# Patient Record
Sex: FEMALE | Race: BLACK OR AFRICAN AMERICAN | Employment: FULL TIME | URBAN - METROPOLITAN AREA
[De-identification: names, ages, dates, MRNs, and addresses within clinical notes are randomized per-mention and may not be internally consistent; named-entity substitution may affect disease eponyms.]

---

## 2017-01-16 LAB
BLOOD BANK REF CASE: NORMAL
BLOOD BANK REF CASE: NORMAL

## 2017-03-01 LAB
BLOOD BANK REF CASE: NORMAL
BLOOD BANK REF CASE: NORMAL

## 2017-05-17 LAB
HPV COMMENT: NORMAL
HPV TYPE 16: NOT DETECTED
HPV TYPE 18: NOT DETECTED
HPVOH (OTHER TYPES): NOT DETECTED

## 2018-07-10 ENCOUNTER — HOSPITAL ENCOUNTER (OUTPATIENT)
Dept: MAMMOGRAPHY | Age: 43
Discharge: HOME | End: 2018-07-10
Attending: FAMILY MEDICINE | Admitting: FAMILY MEDICINE

## 2018-07-10 DIAGNOSIS — Z12.31 VISIT FOR SCREENING MAMMOGRAM: ICD-10-CM

## 2019-11-14 ENCOUNTER — HOSPITAL ENCOUNTER (OUTPATIENT)
Dept: MAMMOGRAPHY | Age: 44
Discharge: HOME | End: 2019-11-14
Payer: COMMERCIAL

## 2019-11-14 DIAGNOSIS — Z12.31 BREAST CANCER SCREENING BY MAMMOGRAM: ICD-10-CM

## 2019-11-14 PROCEDURE — 77067 SCR MAMMO BI INCL CAD: CPT

## 2022-11-18 ENCOUNTER — HOSPITAL ENCOUNTER (OUTPATIENT)
Dept: MAMMOGRAPHY | Age: 47
Discharge: HOME | End: 2022-11-18
Payer: COMMERCIAL

## 2022-11-18 VITALS — WEIGHT: 170 LBS | BODY MASS INDEX: 23.03 KG/M2 | HEIGHT: 72 IN

## 2022-11-18 DIAGNOSIS — Z12.31 BREAST CANCER SCREENING BY MAMMOGRAM: ICD-10-CM

## 2022-11-18 PROCEDURE — 77063 BREAST TOMOSYNTHESIS BI: CPT

## 2022-12-20 ENCOUNTER — HOSPITAL ENCOUNTER (OUTPATIENT)
Dept: WOMENS IMAGING | Age: 47
Discharge: HOME | End: 2022-12-20
Payer: COMMERCIAL

## 2022-12-20 ENCOUNTER — PRE-PROCEDURE TELEPHONE (OUTPATIENT)
Dept: WOMENS IMAGING | Age: 47
End: 2022-12-20

## 2022-12-20 ENCOUNTER — HOSPITAL ENCOUNTER (OUTPATIENT)
Dept: ULTRASOUND IMAGING | Age: 47
Discharge: HOME | End: 2022-12-20
Payer: COMMERCIAL

## 2022-12-20 DIAGNOSIS — R92.8 ABNORMAL MAMMOGRAM: ICD-10-CM

## 2022-12-20 PROCEDURE — 77065 DX MAMMO INCL CAD UNI: CPT

## 2022-12-20 PROCEDURE — 76642 ULTRASOUND BREAST LIMITED: CPT

## 2023-01-03 ENCOUNTER — HOSPITAL ENCOUNTER (OUTPATIENT)
Dept: WOMENS IMAGING | Age: 48
Discharge: HOME | End: 2023-01-03
Payer: COMMERCIAL

## 2023-01-03 DIAGNOSIS — R92.8 ABNORMAL MAMMOGRAM: ICD-10-CM

## 2023-01-03 PROCEDURE — 76098 X-RAY EXAM SURGICAL SPECIMEN: CPT

## 2023-01-03 PROCEDURE — 77065 DX MAMMO INCL CAD UNI: CPT

## 2023-01-03 PROCEDURE — 2500000003 HC RX 250 WO HCPCS: Performed by: OBSTETRICS & GYNECOLOGY

## 2023-01-03 PROCEDURE — 2720000010 MAM STEREO BREAST BX W LOC DEVICE 1ST LESION RIGHT

## 2023-01-03 PROCEDURE — 88305 TISSUE EXAM BY PATHOLOGIST: CPT

## 2023-01-03 RX ORDER — LIDOCAINE HYDROCHLORIDE 10 MG/ML
5 INJECTION, SOLUTION INFILTRATION; PERINEURAL ONCE
Status: COMPLETED | OUTPATIENT
Start: 2023-01-03 | End: 2023-01-03

## 2023-01-03 RX ADMIN — LIDOCAINE HYDROCHLORIDE 15 ML: 10; .005 INJECTION, SOLUTION EPIDURAL; INFILTRATION; INTRACAUDAL; PERINEURAL at 12:34

## 2023-01-03 RX ADMIN — LIDOCAINE HYDROCHLORIDE 1 ML: 10 INJECTION, SOLUTION EPIDURAL; INFILTRATION; INTRACAUDAL; PERINEURAL at 12:33

## 2023-01-03 ASSESSMENT — PAIN SCALES - GENERAL
PAINLEVEL_OUTOF10: 0
PAINLEVEL_OUTOF10: 0

## 2023-01-03 NOTE — PROGRESS NOTES
Patient here for breast biopsy. IN reviewed the health history, allergies and medications. Family member, daughter drove her. Radiologist reviews procedure with patient, consent signed. Patient tolerates procedure well. Compression held. Site cleansed with chloraprep, steri strips and dry dressing applied. Ice pack in place. Reviewed discharge instructions with patient and signed copy. Patient verbalized understanding and agreed to contact Nurse Navigator with any questions. Patient A&Ox3, steady on feet and discharged home.

## 2023-01-04 ENCOUNTER — TELEPHONE (OUTPATIENT)
Dept: WOMENS IMAGING | Age: 48
End: 2023-01-04

## 2023-01-04 NOTE — TELEPHONE ENCOUNTER
Imaging Navigator reviewed results of breast biopsy which showed breast tissue with sclerosing adenosis on the pathology report. Negative for atypia or malignancy. iN reviewed radiologist follow up recommendations as a right Diagnostic mammogram in 6 months.

## 2024-09-22 NOTE — PROGRESS NOTES
[facility] Internal Medicine Daily Progress Note    Date of Service  9/22/2024    [facility] Team: R IM Green Team   Attending: Jeremy Strange M.d.  Senior Resident: Dr. Larry Castaneda  Intern:  Dr. Christy Guadalupe  Contact Number: [phone]    Chief Complaint    Nieves Murphy is a 71 y.o. female admitted 9/17/2024 with Leg Swelling     Hospital Course    Nieves Murphy is a pleasant 71 y.o. female with a past medical history of diabetes, arthritis, anxiety, hypothyroidism and chronic lower extremity DVT, who presented to the ED on 9/17/2024 with right leg swelling and pain.     According to the patient she was in her usual state of health and doing well until 4 months ago when she had a fall.  At that time she felt like her right leg bone was broken but she did not come to the hospital or get checked.  She feels like the swelling has been present since then and it has been constant.  She was okay with the swelling until a few days ago when she ran into a table and hurt her right leg.  Since then, the swelling has been worsening, and it is associated with pain.  She denies any other associated symptoms including chest pain or shortness of breath.  She has had a previous history of left lower extremity DVT, (which she is unable to recall how many years ago) but has been on Xarelto since then.  She claims that she is completely compliant with all her medications and do not miss any of her pills.  She also adds that she has been bedridden since 2010 due to worsening arthritis and uses a wheelchair to ambulate.     Her caregiver adds that for the past few weeks, when she sits in her chair her foot starts swelling. He also noticed some tender points on his on her right lower leg.    ED Management:  - Vital signs were checked which were stable.  - Patient started on Lovenox 120 mg.  - X-ray tibia/fibula done which was unremarkable.  - Her CBC was found to be normal, Chem panel showed low potassium levels.  - Her pro BNP was  Imaging Navigator reviewed pre-procedure stereo breast biopsy patient education information in person and gave written instructions. Reviewed medications and need to hold all blood thinners per instructions. None to hold   Patient should take all other medications as prescribed. Patient to eat and drink as normal prior to the procedure. Wear a bra with good support and a two piece outfit for comfort. Patient can bring someone with you but you can also drive yourself. Plan on being at the breast center for 2-2 and a half hours. Reviewed the process of a biopsy - sitting in a chair with your breast compressed similar to a mammogram with frequent images taken throughout the procedure. The skin is cleaned and a local anesthetic is given to numb the area. A small skin nick is made for the biopsy needle and once in place, samples are taken and then xrayed for confirmation. A tiny tissue marker is then placed inside your breast at the site of the biopsy for future reference. Then pressure is hold on the biopsy site to stop the bleeding and a bandage and waterproof dressing is applied. A mammogram is then done to validate the tissue marker. The tissue sample is sent to pathology. Results will come back in 2-3 business days and sent to your referring physician. Either they or the nurse navigator will call you with the results and recommended  follow up needed  Patient verbalizes understanding. found to be 719.  - CT for PE ordered that showed cardiomegaly and coronary calcification.  - Ultrasound bilateral lower extremity showed acute to subacute, partially occlusive thrombus of the right distal common femoral vein. Acute to subacute, partially occlusive thrombus in the right proximal,middle and distal femoral vein.     She was admitted on the floor for further assessment and management of right lower extremity DVT    09/18- Heparin infusion was started at 2 AM 09/18.Her TSH was high, 72.5 and her free T4 was found to be 0.66.  When inquired about her taking thyroid medications she said that she is not aware of any thyroid medications that she takes. Home dose of Levothyroxine resumed 09/18. Her HbA1c was found to be 5.3%.    Hematology consulted for possible Xeralto failure, she disclosed to  them that she has been non compliant with her medications and that might have caused the blood clots. Further work up deferred.     09/19- She had persistently low blood pressures.  Was initially given 2 boluses, but her blood pressures remained soft.Rapid response team rounded on her for low blood pressures.She was started on hydrocortisone 100 mg 3 times daily IV + Midodrine 5mg TID-PRN.  Changed from Heparin to oral therapy was deferred due to her unstable blood pressures.  She was monitored for hypotension. Hemoglobin stable. Orthostatics were negative.    09/20- Heparin drip stopped 09/20.  Patient shifted back to Xarelto.    09/21- Patient was scheduled with oxycodone for her right lower extremity pain. Hydrocortisone tapered from 100mg 3 times daily to 50mg 3 times daily.    Interval Problem Update    Overnight events: Telemetry informed about lengthy pauses with bradycardia, heart rate between 40s.  The patient was asymptomatic.  EKG done showed atrial premature complexes with sinus pauses and TN prolongation.    She states that she feels okay. She does not have any active complaints.  - She denies any chest  pain or shortness of breath.  - Her vital signs are stable  - Lower extremity swelling resolved, Pain still present.  - Ambulated out of bed   - Hydrocortisone tapered from 50mg BID since her blood pressure in under control.She is now on Oral 10mg in the morning and 5 mg at night.   - Her last Potassium was 3.6- Will continue repletion. Target Potassium >4.     I have discussed this patient's plan of care and discharge plan at IDT rounds today with Case Management, Nursing, Nursing leadership, and other members of the IDT team.She has home health approval.     Consultants/Specialty  Hematology    Code Status  Full Code    Disposition  The patient is not medically cleared for discharge to home or a post-acute facility.      Review of Systems  Review of Systems   Constitutional: Negative.    HENT: Negative.     Eyes: Negative.    Respiratory: Negative.     Cardiovascular: Negative.    Gastrointestinal: Negative.    Genitourinary: Negative.    Musculoskeletal:  Positive for falls.        Right leg pain    Skin: Negative.    Neurological:  Positive for weakness.   Endo/Heme/Allergies: Negative.    Psychiatric/Behavioral: Negative.          Physical Exam  Temp:  [36.1 °C (97 °F)-36.9 °C (98.5 °F)] 36.3 °C (97.3 °F)  Pulse:  [66-82] 75  Resp:  [16-18] 18  BP: (117-122)/(66-76) 117/68  SpO2:  [93 %-96 %] 95 %    Physical Exam  Constitutional:       Appearance: Normal appearance. She is obese.   HENT:      Head: Normocephalic and atraumatic.      Right Ear: Tympanic membrane, ear canal and external ear normal.      Left Ear: Tympanic membrane, ear canal and external ear normal.      Nose: Nose normal.      Mouth/Throat:      Mouth: Mucous membranes are moist.   Eyes:      Extraocular Movements: Extraocular movements intact.      Pupils: Pupils are equal, round, and reactive to light.   Cardiovascular:      Rate and Rhythm: Normal rate and regular rhythm.      Pulses: Normal pulses.      Heart sounds: Normal heart sounds.    Pulmonary:      Effort: Pulmonary effort is normal.      Breath sounds: Normal breath sounds.   Abdominal:      General: Bowel sounds are normal.      Palpations: Abdomen is soft.   Musculoskeletal:         General: Swelling and tenderness present.      Cervical back: Normal range of motion and neck supple.      Right lower leg: No edema.      Left lower leg: No edema.   Skin:     General: Skin is warm.      Capillary Refill: Capillary refill takes less than 2 seconds.   Neurological:      General: No focal deficit present.      Mental Status: She is alert. Mental status is at baseline.   Psychiatric:         Mood and Affect: Mood normal.         Behavior: Behavior normal.         Thought Content: Thought content normal.         Judgment: Judgment normal.         Fluids    Intake/Output Summary (Last 24 hours) at 9/22/2024 1107  Last data filed at 9/22/2024 0800  Gross per 24 hour   Intake 0 ml   Output 1375 ml   Net -1375 ml       Laboratory  Recent Labs     09/19/24  1409 09/20/24  0005   WBC 4.9 4.4*   RBC 3.93* 3.53*   HEMOGLOBIN 10.6* 9.7*   HEMATOCRIT 33.9* 31.0*   MCV 86.3 87.8   MCH 27.0 27.5   MCHC 31.3* 31.3*   RDW 62.3* 62.7*   PLATELETCT 208 199   MPV 10.1 10.3     Recent Labs     09/20/24  0005 09/21/24  1329   SODIUM 136 136   POTASSIUM 4.3 3.6   CHLORIDE 106 101   CO2 23 25   GLUCOSE 158* 150*   BUN 5* 10   CREATININE 0.53 0.50   CALCIUM 8.1* 8.4*     Recent Labs     09/19/24  2309 09/20/24  0005 09/20/24  0910   APTT 125.2* 123.0* 42.0*                 Imaging  CT-CTA CHEST PULMONARY ARTERY W/ RECONS   Final Result      1.  No evidence of pulmonary embolism.   2.  L2 vertebral body superior endplate collapse, partly in the field-of-view.   3.  Cardiomegaly.   4.  Coronary calcification.   5.  No acute infiltrates.      Fleischner Society pulmonary nodule recommendations:         US-EXTREMITY VENOUS LOWER BILAT   Final Result      DX-TIBIA AND FIBULA RIGHT   Final Result      Demineralization,  postsurgical and degenerative change without acute fracture or malalignment of the tibia and fibula.           Assessment/Plan  Problem Representation:    * Leg swelling- (present on admission)  Assessment & Plan  Patient denies shortness of breath, chest pain, orthopnea, dyspnea.  - No signs of heart failure.  - Probably secondary to DVT  - Pro .  - Daily weights.  - I/O charting  - PT/OT  - BMP if needed       DVT (deep venous thrombosis) (CMS-[facility])- (present on admission)  Assessment & Plan  Patient presented with increased bilateral lower extremity swelling 09/17   - Ultrasound bilateral lower extremity 09/17- showed acute to subacute, partially occlusive thrombus of the right distal common femoral vein. Acute to subacute, partially occlusive thrombus in the right proximal,middle and distal femoral vein.  - Was previously on Xarelto.  Will hold 09/17  - Resumed Xarelto 09/20.  - Started on Lovenox in the  mg.  Held on 09/17  - Heparin Infusion started 09/18. Discontinued 09/20  - CT for PE negative  - Hematology consulted for possible Xarelto failure - Ruled out 09/18. Signed off      Atrial fibrillation (HCC)- (present on admission)  Assessment & Plan  Patient does not remember taking any cardiac medications.  - Metoprolol on hold.  - Will monitor on telemetry      Hypotension  Assessment & Plan  - tapering on IV Hyrdrocotisone 50mg TID. 09/21- Transitioned to Oral 10mg in the morning and 5 mg at night.   - Morning cortisol ordered   - Orthostatics: Negative  - On Midodrine 5mg PRN   - Hb stable  - Will monitor    Hypothyroidism- (present on admission)  Assessment & Plan  Her TSH is 72.5 and her free T4 is 0.66.  When inquired about her taking thyroid medications she said that she is not aware of any thyroid medications that she takes.  - Patient counseled   - Home dose of Levothyroxine resumed     Mood disorder (HCC)- (present on admission)  Assessment & Plan  Will continue home medications.          VTE prophylaxis: heparin ppx    I have performed a physical exam and reviewed and updated ROS and Plan today (9/22/2024). In review of yesterday's note (9/21/2024), there are no changes except as documented above.